# Patient Record
Sex: FEMALE | Race: BLACK OR AFRICAN AMERICAN | Employment: PART TIME | URBAN - METROPOLITAN AREA
[De-identification: names, ages, dates, MRNs, and addresses within clinical notes are randomized per-mention and may not be internally consistent; named-entity substitution may affect disease eponyms.]

---
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
## 2022-08-31 ENCOUNTER — OFFICE VISIT (OUTPATIENT)
Dept: PRIMARY CARE CLINIC | Age: 22
End: 2022-08-31
Payer: COMMERCIAL

## 2022-08-31 VITALS
WEIGHT: 97 LBS | SYSTOLIC BLOOD PRESSURE: 98 MMHG | HEART RATE: 81 BPM | TEMPERATURE: 97 F | DIASTOLIC BLOOD PRESSURE: 65 MMHG | HEIGHT: 61 IN | BODY MASS INDEX: 18.31 KG/M2

## 2022-08-31 DIAGNOSIS — Z00.00 ANNUAL PHYSICAL EXAM: ICD-10-CM

## 2022-08-31 DIAGNOSIS — N73.0 PID (ACUTE PELVIC INFLAMMATORY DISEASE): Primary | ICD-10-CM

## 2022-08-31 DIAGNOSIS — N73.0 PID (ACUTE PELVIC INFLAMMATORY DISEASE): ICD-10-CM

## 2022-08-31 PROCEDURE — 1036F TOBACCO NON-USER: CPT | Performed by: STUDENT IN AN ORGANIZED HEALTH CARE EDUCATION/TRAINING PROGRAM

## 2022-08-31 PROCEDURE — G8427 DOCREV CUR MEDS BY ELIG CLIN: HCPCS | Performed by: STUDENT IN AN ORGANIZED HEALTH CARE EDUCATION/TRAINING PROGRAM

## 2022-08-31 PROCEDURE — G8419 CALC BMI OUT NRM PARAM NOF/U: HCPCS | Performed by: STUDENT IN AN ORGANIZED HEALTH CARE EDUCATION/TRAINING PROGRAM

## 2022-08-31 PROCEDURE — 96372 THER/PROPH/DIAG INJ SC/IM: CPT | Performed by: STUDENT IN AN ORGANIZED HEALTH CARE EDUCATION/TRAINING PROGRAM

## 2022-08-31 PROCEDURE — 99204 OFFICE O/P NEW MOD 45 MIN: CPT | Performed by: STUDENT IN AN ORGANIZED HEALTH CARE EDUCATION/TRAINING PROGRAM

## 2022-08-31 RX ORDER — CEFTRIAXONE 500 MG/1
500 INJECTION, POWDER, FOR SOLUTION INTRAMUSCULAR; INTRAVENOUS ONCE
Status: COMPLETED | OUTPATIENT
Start: 2022-08-31 | End: 2022-08-31

## 2022-08-31 RX ADMIN — CEFTRIAXONE 500 MG: 500 INJECTION, POWDER, FOR SOLUTION INTRAMUSCULAR; INTRAVENOUS at 14:45

## 2022-08-31 ASSESSMENT — PATIENT HEALTH QUESTIONNAIRE - PHQ9
SUM OF ALL RESPONSES TO PHQ QUESTIONS 1-9: 0
1. LITTLE INTEREST OR PLEASURE IN DOING THINGS: 0
2. FEELING DOWN, DEPRESSED OR HOPELESS: 0
SUM OF ALL RESPONSES TO PHQ9 QUESTIONS 1 & 2: 0
SUM OF ALL RESPONSES TO PHQ QUESTIONS 1-9: 0

## 2022-08-31 ASSESSMENT — ENCOUNTER SYMPTOMS
CONSTIPATION: 0
VOMITING: 0
DIARRHEA: 0
ABDOMINAL PAIN: 1
ABDOMINAL DISTENTION: 0
BLOOD IN STOOL: 0
NAUSEA: 0

## 2022-08-31 NOTE — PROGRESS NOTES
[facility]  Establish care visit   2022    Ofe Garcia (:  2000) is a 25 y.o. female, here to establish care. Chief Complaint   Patient presents with    Follow-up    Other     Wants std check , no sx         ASSESSMENT/ PLAN  1. PID (acute pelvic inflammatory disease)  Uncontrolled, nonadherent to medications as prescribed previously because she cannot tolerate pills. Ceftriaxone administered in clinic, Flagyl and doxycycline prescribed in suspension form. STI panel, pregnancy test and urinalysis with culture ordered. Discussed importance of barrier protection and strongly encouraged that the patient's partner be tested and treated. - Hepatitis C Antibody; Future  - HIV Screen; Future  - RPR Reflex; Future  - Trichomonas by EIA; Future  - C.trachomatis N.gonorrhoeae DNA, Urine; Future  - Pregnancy, Urine; Future  - Culture, Urine; Future  - Urinalysis with Microscopic; Future  - metroNIDAZOLE (FLAGYL); Take 10 mLs by mouth every 12 hours  Dispense: 140 mL; Refill: 0  - doxycycline calcium (VIBRAMYCIN) 50 MG/5ML SYRP syrup; Take 10 mLs by mouth every 12 hours for 7 days  Dispense: 140 mL; Refill: 0    2. Annual physical exam  Labs ordered for follow-up physical  - CBC with Auto Differential; Future  - Comprehensive Metabolic Panel; Future  - Hemoglobin A1C; Future  - Lipid Panel; Future     Return in about 2 weeks (around 2022) for annual physical.    HPI  Patient presents today to establish care with concerns of untreated PID. Per ED report:    \"Sabiha Torres is a 25 y.o. female presenting to -B with a light brown vaginal discharge that started 2 weeks ago along with abdominal pain. Points to her lower abdomen as the site of pain. States it is sharp, and comes and goes. No dysuria, frequency, urgency, or hematuria. No back pain, fevers, nausea, or vomiting. LMP was end of . Periods are monthly. No OCP. Last bowel movement this morning. Daily bowel movement. Medium sized stool. No blood in the stool. No straining with bowel movements. No runny nose, congestion, cough, sore throat, vomiting, diarrhea. No underlying health issues. NKDA. No daily medications. Immunizations are UTD. Pt is sexually active with 1 male partner x 2 years. They last had sex 1 month ago. Pt reports they sometimes use condoms. Pt has had a chlamydial infection in the past.     Summary of Treatment in ED:  Medications   acetaminophen (TYLENOL) 160 MG/5ML suspension 672 mg (672 mg Oral Given 7/13/22 1114)   cefTRIAXone (ROCEPHIN) 500 mg in lidocaine PF (XYLOCAINE) 1 % 1.43 mL injection (500 mg Intramuscular Given 7/13/22 1156)   metroNIDAZOLE (FLAGYL) tablet 500 mg (500 mg Oral Given 7/13/22 1218)   doxycycline monohydrate (VIBRAMYCIN) 25 MG/5ML suspension 100 mg (100 mg Oral Given 7/13/22 1321)     Impression     1. PID (pelvic inflammatory disease)   2. Vaginal discharge   3. Lower abdominal pain\"    Patient notes that the above symptoms are persistent and unchanged. She did not take the Flagyl or doxycycline as prescribed from the emergency department because she states that she cannot swallow pills. States that she is unsure if her partner has been treated or not. ROS  Review of Systems   Constitutional:  Negative for activity change, appetite change, fatigue, fever and unexpected weight change. Cardiovascular:  Negative for leg swelling. Gastrointestinal:  Positive for abdominal pain. Negative for abdominal distention, blood in stool, constipation, diarrhea, nausea and vomiting. Genitourinary:  Positive for dysuria, pelvic pain and vaginal discharge. Negative for flank pain, genital sores, hematuria and menstrual problem. Musculoskeletal:  Negative for myalgias. Allergic/Immunologic: Negative for food allergies. Psychiatric/Behavioral:  The patient is not nervous/anxious. HISTORIES  No current outpatient medications on file prior to visit.      No current facility-administered medications on file prior to visit. No Known Allergies    History reviewed. No pertinent past medical history. Patient Active Problem List   Diagnosis    PID (acute pelvic inflammatory disease)       History reviewed. No pertinent surgical history. Social History     Socioeconomic History    Marital status: Single     Spouse name: Not on file    Number of children: Not on file    Years of education: Not on file    Highest education level: Not on file   Occupational History    Not on file   Tobacco Use    Smoking status: Unknown    Smokeless tobacco: Never   Vaping Use    Vaping Use: Never used   Substance and Sexual Activity    Alcohol use: Yes     Comment: occo    Drug use: Yes    Sexual activity: Yes     Partners: Male   Other Topics Concern    Not on file   Social History Narrative    She lives with her mom and feel safe      Social Determinants of Health     Financial Resource Strain: Not on file   Food Insecurity: Not on file   Transportation Needs: Not on file   Physical Activity: Not on file   Stress: Not on file   Social Connections: Not on file   Intimate Partner Violence: Not on file   Housing Stability: Not on file        History reviewed. No pertinent family history. PE  Vitals:    08/31/22 1255   BP: 98/65   Site: Left Upper Arm   Position: Sitting   Cuff Size: Medium Adult   Pulse: 81   Temp: 97 °F (36.1 °C)   TempSrc: Temporal   Weight: (!) 97 lb (44 kg)   Height: 5' 1.22\" (1.555 m)     Estimated body mass index is 18.2 kg/m² as calculated from the following:    Height as of this encounter: 5' 1.22\" (1.555 m). Weight as of this encounter: 97 lb (44 kg). Physical Exam  Vitals and nursing note reviewed. Constitutional:       Appearance: Normal appearance. She is not ill-appearing. HENT:      Head: Normocephalic and atraumatic. Mouth/Throat:      Mouth: Mucous membranes are moist.      Pharynx: Oropharynx is clear.    Cardiovascular:      Rate and Rhythm: Normal rate and regular rhythm. Pulmonary:      Effort: Pulmonary effort is normal.      Breath sounds: Normal breath sounds. Abdominal:      General: Abdomen is flat. Bowel sounds are normal. There is no distension. Palpations: Abdomen is soft. Tenderness: There is abdominal tenderness. There is no right CVA tenderness, left CVA tenderness, guarding or rebound. Comments: Suprapubic tenderness to palpation, no CVA tenderness   Skin:     General: Skin is warm. Coloration: Skin is not jaundiced. Neurological:      Mental Status: She is alert.    Psychiatric:         Mood and Affect: Mood normal.         Behavior: Behavior normal.       Immunization History   Administered Date(s) Administered    DTaP (Infanrix) 02/25/2003, 05/22/2003, 07/16/2003, 01/06/2005, 10/31/2007    HIB PRP-T (ActHIB, Hiberix) 02/25/2003, 05/22/2003, 01/06/2005    HPV 9-valent Bobbe Crick) 02/17/2017, 11/09/2018    Hepatitis A Ped/Adol (Havrix, Vaqta) 02/17/2017, 11/09/2018    Hepatitis B vaccine 02/25/2003, 05/22/2003, 01/06/2005    MMRV (ProQuad) 01/06/2005, 10/31/2007    Meningococcal, MCV4, Unspecifd Conjugate (A,C,Y and W-135) 02/17/2017    Pneumococcal Conjugate 13-valent (Rylan Khalida) 05/22/2003, 07/16/2003, 01/06/2005    Pneumococcal Conjugate 7-valent (Jovany Pouch) 02/25/2003    Polio IPV (IPOL) 05/22/2003, 07/16/2003, 01/06/2005, 10/31/2007    Tdap (Boostrix, Adacel) 02/17/2017    Varicella (Varivax) 01/06/2005, 10/31/2007       Health Maintenance   Topic Date Due    COVID-19 Vaccine (1) Never done    Depression Screen  Never done    Hepatitis C screen  Never done    Flu vaccine (1) 09/01/2022    Chlamydia screen  07/13/2023    DTaP/Tdap/Td vaccine (7 - Td or Tdap) 02/17/2027    Hepatitis A vaccine  Completed    Hepatitis B vaccine  Completed    Hib vaccine  Completed    HPV vaccine  Completed    Varicella vaccine  Completed    Pneumococcal 0-64 years Vaccine  Completed    HIV screen  Completed Meningococcal (ACWY) vaccine  Aged Out       PSH, PMH, SH and FH reviewed and noted. Recent and past labs, tests and consults also reviewed. Recent or new meds also reviewed. Ольга Russell DO    This dictation was generated by voice recognition computer software. Although all attempts are made to edit the dictation for accuracy, there may be errors in the transcription that are not intended.

## 2022-09-01 ENCOUNTER — TELEPHONE (OUTPATIENT)
Dept: PRIMARY CARE CLINIC | Age: 22
End: 2022-09-01

## 2022-09-01 LAB
A/G RATIO: 1.3 (ref 1.1–2.2)
ALBUMIN SERPL-MCNC: 4.6 G/DL (ref 3.4–5)
ALP BLD-CCNC: 72 U/L (ref 40–129)
ALT SERPL-CCNC: 16 U/L (ref 10–40)
ANION GAP SERPL CALCULATED.3IONS-SCNC: 13 MMOL/L (ref 3–16)
AST SERPL-CCNC: 20 U/L (ref 15–37)
ATYPICAL LYMPHOCYTE RELATIVE PERCENT: 1 % (ref 0–6)
BACTERIA: ABNORMAL /HPF
BANDED NEUTROPHILS RELATIVE PERCENT: 1 % (ref 0–7)
BASOPHILS ABSOLUTE: 0 K/UL (ref 0–0.2)
BASOPHILS RELATIVE PERCENT: 0 %
BILIRUB SERPL-MCNC: 0.3 MG/DL (ref 0–1)
BILIRUBIN URINE: NEGATIVE
BLOOD, URINE: NEGATIVE
BUN BLDV-MCNC: 9 MG/DL (ref 7–20)
CALCIUM SERPL-MCNC: 9 MG/DL (ref 8.3–10.6)
CHLORIDE BLD-SCNC: 104 MMOL/L (ref 99–110)
CHOLESTEROL, TOTAL: 157 MG/DL (ref 0–199)
CLARITY: CLEAR
CO2: 23 MMOL/L (ref 21–32)
COLOR: YELLOW
CREAT SERPL-MCNC: 0.9 MG/DL (ref 0.6–1.1)
EOSINOPHILS ABSOLUTE: 0.2 K/UL (ref 0–0.6)
EOSINOPHILS RELATIVE PERCENT: 6 %
EPITHELIAL CELLS, UA: 8 /HPF (ref 0–5)
ESTIMATED AVERAGE GLUCOSE: 105.4 MG/DL
GFR AFRICAN AMERICAN: >60
GFR NON-AFRICAN AMERICAN: >60
GLUCOSE BLD-MCNC: 73 MG/DL (ref 70–99)
GLUCOSE URINE: NEGATIVE MG/DL
HBA1C MFR BLD: 5.3 %
HCG(URINE) PREGNANCY TEST: NEGATIVE
HCT VFR BLD CALC: 34.6 % (ref 36–48)
HDLC SERPL-MCNC: 52 MG/DL (ref 40–60)
HEMATOLOGY PATH CONSULT: NORMAL
HEMATOLOGY PATH CONSULT: YES
HEMOGLOBIN: 11.1 G/DL (ref 12–16)
HEPATITIS C ANTIBODY INTERPRETATION: NORMAL
HIV AG/AB: NORMAL
HIV ANTIGEN: NORMAL
HIV-1 ANTIBODY: NORMAL
HIV-2 AB: NORMAL
HYALINE CASTS: 0 /LPF (ref 0–8)
KETONES, URINE: ABNORMAL MG/DL
LDL CHOLESTEROL CALCULATED: 96 MG/DL
LEUKOCYTE ESTERASE, URINE: NEGATIVE
LYMPHOCYTES ABSOLUTE: 1.9 K/UL (ref 1–5.1)
LYMPHOCYTES RELATIVE PERCENT: 60 %
MCH RBC QN AUTO: 24 PG (ref 26–34)
MCHC RBC AUTO-ENTMCNC: 32 G/DL (ref 31–36)
MCV RBC AUTO: 75 FL (ref 80–100)
MICROSCOPIC EXAMINATION: YES
MONOCYTES ABSOLUTE: 0.2 K/UL (ref 0–1.3)
MONOCYTES RELATIVE PERCENT: 8 %
NEUTROPHILS ABSOLUTE: 0.8 K/UL (ref 1.7–7.7)
NEUTROPHILS RELATIVE PERCENT: 24 %
NITRITE, URINE: NEGATIVE
OVALOCYTES: ABNORMAL
PDW BLD-RTO: 15.5 % (ref 12.4–15.4)
PH UA: 6.5 (ref 5–8)
PLATELET # BLD: 272 K/UL (ref 135–450)
PMV BLD AUTO: 7.6 FL (ref 5–10.5)
POTASSIUM SERPL-SCNC: 4.3 MMOL/L (ref 3.5–5.1)
PROTEIN UA: 100 MG/DL
RBC # BLD: 4.62 M/UL (ref 4–5.2)
RBC # BLD: NORMAL 10*6/UL
RBC UA: 1 /HPF (ref 0–4)
SODIUM BLD-SCNC: 140 MMOL/L (ref 136–145)
SPECIFIC GRAVITY UA: 1.03 (ref 1–1.03)
SPECIMEN TYPE: NORMAL
TOTAL PROTEIN: 8.2 G/DL (ref 6.4–8.2)
TOTAL SYPHILLIS IGG/IGM: NORMAL
TRICHOMONAS VAGINALIS SCREEN: NEGATIVE
TRIGL SERPL-MCNC: 47 MG/DL (ref 0–150)
URINE CULTURE, ROUTINE: NORMAL
URINE TYPE: ABNORMAL
UROBILINOGEN, URINE: 1 E.U./DL
VLDLC SERPL CALC-MCNC: 9 MG/DL
WBC # BLD: 3.1 K/UL (ref 4–11)
WBC UA: 1 /HPF (ref 0–5)

## 2022-09-01 NOTE — TELEPHONE ENCOUNTER
Spoke with pt she stated she does have random fevers sometimes and not sure why I read dr Bullard Ace message to the pt and she told me that if she is not feeling well she will call back in to make a michael.

## 2022-09-01 NOTE — TELEPHONE ENCOUNTER
Please call patient and inquire if she is having fever. If so she needs to be seen. Good morning Dashanti,  This is Dr. Joel Norton replying for Dr. Kareem Quiles who is out of the office. You had a critically low white blood cell count on your most recent labs. Oftentimes, this is of no clinical significance but if you are having fever, I would like you to come back in and see us in the clinic prior to your follow-up with Dr. Kareem Quiles on September 14. I will have my staff reach out to check on how you are doing and route this message to Dr. Israel Wheeler as well.     Thanks,  Carlyle Barron MD

## 2022-09-02 LAB
C. TRACHOMATIS DNA ,URINE: NEGATIVE
N. GONORRHOEAE DNA, URINE: NEGATIVE

## 2022-09-22 ENCOUNTER — TELEPHONE (OUTPATIENT)
Dept: PRIMARY CARE CLINIC | Age: 22
End: 2022-09-22